# Patient Record
Sex: FEMALE | Race: WHITE | URBAN - METROPOLITAN AREA
[De-identification: names, ages, dates, MRNs, and addresses within clinical notes are randomized per-mention and may not be internally consistent; named-entity substitution may affect disease eponyms.]

---

## 2024-05-18 ENCOUNTER — OFFICE VISIT (OUTPATIENT)
Age: 39
End: 2024-05-18

## 2024-05-18 VITALS
OXYGEN SATURATION: 98 % | BODY MASS INDEX: 29.44 KG/M2 | TEMPERATURE: 98.3 F | SYSTOLIC BLOOD PRESSURE: 138 MMHG | WEIGHT: 160 LBS | HEART RATE: 57 BPM | DIASTOLIC BLOOD PRESSURE: 76 MMHG | HEIGHT: 62 IN

## 2024-05-18 DIAGNOSIS — S99.911A INJURY OF RIGHT ANKLE, INITIAL ENCOUNTER: ICD-10-CM

## 2024-05-18 DIAGNOSIS — M25.571 ACUTE RIGHT ANKLE PAIN: Primary | ICD-10-CM

## 2024-05-18 RX ORDER — PREDNISONE 20 MG/1
20 TABLET ORAL DAILY
Qty: 5 TABLET | Refills: 0 | Status: SHIPPED | OUTPATIENT
Start: 2024-05-18 | End: 2024-05-23

## 2024-05-18 RX ORDER — FONDAPARINUX SODIUM 2.5 MG/.5ML
INJECTION SUBCUTANEOUS DAILY
COMMUNITY

## 2024-05-18 NOTE — PATIENT INSTRUCTIONS
Thank you for allowing us to care for you today and we hope you feel better soon  Tylenol/Motrin as needed for fever and discomfort  Heat rest and pain will be your guide  New Prescriptions    PREDNISONE (DELTASONE) 20 MG TABLET    Take 1 tablet by mouth daily for 5 days

## 2024-05-18 NOTE — PROGRESS NOTES
Rebekah Dominguez (:  1985) is a 39 y.o. female,New patient, here for evaluation of the following chief complaint(s):  Ankle Pain (Right ankle pain from injury x 1 week, dog leash wrapped around ankle from dog running)      ASSESSMENT/PLAN:    ICD-10-CM    1. Injury of right ankle, initial encounter  S99.911A XR ANKLE RIGHT (MIN 3 VIEWS)      Xray Result (most recent):  XR ANKLE RIGHT (MIN 3 VIEWS) 2024    Narrative  EXAMINATION:  THREE XRAY VIEWS OF THE RIGHT ANKLE    2024 4:22 pm    COMPARISON:  None.    HISTORY:  ORDERING SYSTEM PROVIDED HISTORY: Injury of right ankle, initial encounter    FINDINGS:  Negative for fracture or dislocation.  No soft tissue swelling.  No widening  of the ankle mortise.  Negative for joint effusion.  Tiny calcaneal spurs.    Impression  No acute osseous abnormality.       Dx Diff:fracture, crush injury, contusion   Education and handout provided on diagnosis and management of symptoms.   AVS reviewed with patient. Follow up as needed in UC or with PCP for new or worsening symptoms.   No follow-ups on file.    SUBJECTIVE/OBJECTIVE:  Patient presents today with complaints of right ankle and foot pain that started a week ago when got wrapped around a dog run      History provided by:  Patient   used: No    Ankle Pain         Vitals:    24 1615   BP: (!) 145/79   Site: Right Upper Arm   Position: Sitting   Cuff Size: Large Adult   Pulse: 57   Temp: 98.3 °F (36.8 °C)   TempSrc: Oral   SpO2: 98%   Weight: 72.6 kg (160 lb)   Height: 1.575 m (5' 2\")       Review of Systems    Physical Exam  Constitutional:       Appearance: Normal appearance.   HENT:      Head: Normocephalic.      Nose: Nose normal.      Mouth/Throat:      Mouth: Mucous membranes are moist.      Pharynx: Oropharynx is clear.   Cardiovascular:      Rate and Rhythm: Normal rate and regular rhythm.      Heart sounds: Normal heart sounds.   Pulmonary:      Effort: Pulmonary effort is

## 2024-07-16 SDOH — HEALTH STABILITY: PHYSICAL HEALTH: ON AVERAGE, HOW MANY DAYS PER WEEK DO YOU ENGAGE IN MODERATE TO STRENUOUS EXERCISE (LIKE A BRISK WALK)?: 3 DAYS

## 2024-07-16 SDOH — HEALTH STABILITY: PHYSICAL HEALTH: ON AVERAGE, HOW MANY MINUTES DO YOU ENGAGE IN EXERCISE AT THIS LEVEL?: 90 MIN

## 2024-07-17 ENCOUNTER — OFFICE VISIT (OUTPATIENT)
Dept: ORTHOPEDIC SURGERY | Age: 39
End: 2024-07-17
Payer: COMMERCIAL

## 2024-07-17 VITALS — WEIGHT: 160 LBS | RESPIRATION RATE: 16 BRPM | BODY MASS INDEX: 29.44 KG/M2 | HEIGHT: 62 IN
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
## 2024-07-17 DIAGNOSIS — S61.452A CAT BITE OF HAND, LEFT, INITIAL ENCOUNTER: Primary | ICD-10-CM

## 2024-07-17 PROCEDURE — 99244 OFF/OP CNSLTJ NEW/EST MOD 40: CPT | Performed by: ORTHOPAEDIC SURGERY

## 2024-07-17 NOTE — PROGRESS NOTES
This 39 y.o.  right hand dominant  is seen in consultation for Jay Reynolds MD with a chief complaint of injury to their left hand, which was injured 5 weeks ago when bitten by her cat.  She noticed pain and swelling of the dorsum of the hand.  She was evaluated at the [facility] ED where her two small puncture wounds were cleaned, dressed and she was placed on Augmentin.  Because of persistent swelling, pain and erythema the patient was seen at an urgent care center 4 days later and placed on doxycycline and prednisone.  She returned to [facility] 12 days later and placed on Clindamycin and Bactrim.  All through this pariod her puncture wounds healed without any drainage.  She is referred for hand/upper extremity evaluation and treatment with complaints of pain, erythema, and swelling and denies fever, malaise or red streaking. There is no history of additional significant injury.  Symptoms have not changed since the date of injury. The pain assessment has been reviewed and is correct.    The patient's social history, past medical history, family history, medications, allergies and review of systems, entered 7/16/24,  have been reviewed, and dated and are recorded in the chart.    On physical examination the patient is Height: 157.5 cm (5' 2\") tall and weighs Weight - Scale: 72.6 kg (160 lb).  Respirations are 18 per minute.  The patient is well nourished, is oriented to time and place, demonstrates appropriate mood and affect as well as normal gait and station.  There is  minimal  soft tissue swelling present about the left hand, middle finger, MCP joint area with 2 healed punctate scars.  There is  minimal  erythema of this small area.  There is no deformity.   Tenderness is not present on palpation in this area.   Range of motion of the hand and middle finger is normal bilaterally and is accompanied by mild pain.  Skin is intact, as is distal circulation and sensation.  Gross